# Patient Record
Sex: FEMALE | URBAN - METROPOLITAN AREA
[De-identification: names, ages, dates, MRNs, and addresses within clinical notes are randomized per-mention and may not be internally consistent; named-entity substitution may affect disease eponyms.]

---

## 2023-07-05 ENCOUNTER — OFFICE VISIT (OUTPATIENT)
Dept: URBAN - METROPOLITAN AREA CLINIC 50 | Facility: CLINIC | Age: 28
End: 2023-07-05
Payer: COMMERCIAL

## 2023-07-05 DIAGNOSIS — Z13.5 ENCOUNTER FOR SCREENING FOR EYE DISORDERS: ICD-10-CM

## 2023-07-05 DIAGNOSIS — H16.223 KERATOCONJUNCTIVITIS SICCA, BILATERAL: Primary | ICD-10-CM

## 2023-07-05 DIAGNOSIS — H52.223 REGULAR ASTIGMATISM, BILATERAL: ICD-10-CM

## 2023-07-05 PROCEDURE — 99204 OFFICE O/P NEW MOD 45 MIN: CPT | Performed by: OPTOMETRIST

## 2023-07-05 ASSESSMENT — KERATOMETRY
OD: 41.37
OS: 41.24

## 2023-07-05 ASSESSMENT — VISUAL ACUITY
OS: 20/20
OD: 20/20

## 2023-07-05 ASSESSMENT — INTRAOCULAR PRESSURE
OD: 18
OS: 18

## 2023-07-05 NOTE — IMPRESSION/PLAN
Impression: Keratoconjunctivitis sicca, bilateral: R52.046. Plan: Dry eyes account for the patient's complaints. There is no evidence of permanent changes to the cornea. Explained condition does not have a cure and will need artificial tears for maintenance, recommended to use 2-4 times a day.

## 2023-07-05 NOTE — IMPRESSION/PLAN
Impression: Encounter for screening for eye disorders: Z13.5. Plan: Advised patient eye health and condition is normal. Will continue to monitor. Fundus photo performed at today's visit for baseline testing.